# Patient Record
Sex: MALE | Race: WHITE
[De-identification: names, ages, dates, MRNs, and addresses within clinical notes are randomized per-mention and may not be internally consistent; named-entity substitution may affect disease eponyms.]

---

## 2021-03-29 ENCOUNTER — HOSPITAL ENCOUNTER (INPATIENT)
Dept: HOSPITAL 26 - MED | Age: 56
LOS: 3 days | Discharge: HOME | DRG: 720 | End: 2021-04-01
Attending: INTERNAL MEDICINE | Admitting: INTERNAL MEDICINE
Payer: COMMERCIAL

## 2021-03-29 VITALS — SYSTOLIC BLOOD PRESSURE: 106 MMHG | DIASTOLIC BLOOD PRESSURE: 68 MMHG

## 2021-03-29 VITALS — SYSTOLIC BLOOD PRESSURE: 118 MMHG | DIASTOLIC BLOOD PRESSURE: 73 MMHG

## 2021-03-29 VITALS — WEIGHT: 230 LBS | HEIGHT: 72 IN | BODY MASS INDEX: 31.15 KG/M2

## 2021-03-29 DIAGNOSIS — L03.116: ICD-10-CM

## 2021-03-29 DIAGNOSIS — E87.6: ICD-10-CM

## 2021-03-29 DIAGNOSIS — E66.9: ICD-10-CM

## 2021-03-29 DIAGNOSIS — E87.5: ICD-10-CM

## 2021-03-29 DIAGNOSIS — Z20.822: ICD-10-CM

## 2021-03-29 DIAGNOSIS — E87.1: ICD-10-CM

## 2021-03-29 DIAGNOSIS — A41.9: Primary | ICD-10-CM

## 2021-03-29 DIAGNOSIS — F15.10: ICD-10-CM

## 2021-03-29 DIAGNOSIS — I10: ICD-10-CM

## 2021-03-29 LAB
ALBUMIN FLD-MCNC: 2.4 G/DL (ref 3.4–5)
ANION GAP SERPL CALCULATED.3IONS-SCNC: 9.9 MMOL/L (ref 8–16)
APPEARANCE UR: CLEAR
AST SERPL-CCNC: 31 U/L (ref 15–37)
BARBITURATES UR QL SCN: NEGATIVE NG/ML
BASOPHILS # BLD AUTO: 0.1 K/UL (ref 0–0.22)
BASOPHILS NFR BLD AUTO: 0.4 % (ref 0–2)
BENZODIAZ UR QL SCN: NEGATIVE NG/ML
BILIRUB SERPL-MCNC: 0.5 MG/DL (ref 0–1)
BILIRUB UR QL STRIP: NEGATIVE
BUN SERPL-MCNC: 6 MG/DL (ref 7–18)
BZE UR QL SCN: NEGATIVE NG/ML
CANNABINOIDS UR QL SCN: NEGATIVE NG/ML
CHLORIDE SERPL-SCNC: 95 MMOL/L (ref 98–107)
CO2 SERPL-SCNC: 27.3 MMOL/L (ref 21–32)
COLOR UR: YELLOW
CREAT SERPL-MCNC: 1.1 MG/DL (ref 0.6–1.3)
EOSINOPHIL # BLD AUTO: 0.2 K/UL (ref 0–0.4)
EOSINOPHIL NFR BLD AUTO: 1.2 % (ref 0–4)
ERYTHROCYTE [DISTWIDTH] IN BLOOD BY AUTOMATED COUNT: 14 % (ref 11.6–13.7)
GFR SERPL CREATININE-BSD FRML MDRD: 90 ML/MIN (ref 90–?)
GLUCOSE SERPL-MCNC: 154 MG/DL (ref 74–106)
GLUCOSE UR STRIP-MCNC: NEGATIVE MG/DL
HCT VFR BLD AUTO: 38.3 % (ref 36–52)
HGB BLD-MCNC: 12.6 G/DL (ref 12–18)
HGB UR QL STRIP: (no result)
LEUKOCYTE ESTERASE UR QL STRIP: NEGATIVE
LYMPHOCYTES # BLD AUTO: 1.5 K/UL (ref 2–11.5)
LYMPHOCYTES NFR BLD AUTO: 9.3 % (ref 20.5–51.1)
MCH RBC QN AUTO: 28 PG (ref 27–31)
MCHC RBC AUTO-ENTMCNC: 33 G/DL (ref 33–37)
MCV RBC AUTO: 85.8 FL (ref 80–94)
MONOCYTES # BLD AUTO: 1 K/UL (ref 0.8–1)
MONOCYTES NFR BLD AUTO: 6 % (ref 1.7–9.3)
NEUTROPHILS # BLD AUTO: 13.1 K/UL (ref 1.8–7.7)
NEUTROPHILS NFR BLD AUTO: 83.1 % (ref 42.2–75.2)
NITRITE UR QL STRIP: NEGATIVE
OPIATES UR QL SCN: NEGATIVE NG/ML
PCP UR QL SCN: NEGATIVE NG/ML
PH UR STRIP: 6 [PH] (ref 5–9)
PLATELET # BLD AUTO: 328 K/UL (ref 140–450)
POTASSIUM SERPL-SCNC: 3.2 MMOL/L (ref 3.5–5.1)
RBC # BLD AUTO: 4.47 MIL/UL (ref 4.2–6.1)
RBC #/AREA URNS HPF: (no result) /HPF (ref 0–5)
SODIUM SERPL-SCNC: 129 MMOL/L (ref 136–145)
WBC # BLD AUTO: 15.7 K/UL (ref 4.8–10.8)
WBC,URINE: (no result) /HPF (ref 0–5)

## 2021-03-29 NOTE — NUR
PT ASLEEP. VISIBLE CHEST RISE AND FALL NOTED. NO S/SX OF PAIN OR DISCOMFORT NOTED. SAFETY 
MEASURES IN PLACE. CALL LIGHT WITHIN REACH. WILL CONTINUE TO MONITOR.

## 2021-03-29 NOTE — NUR
Patient will be admitted to care of . Admited to [facility].  Will go to 
room 105A. Belongings list completed.  Report to MAR VAN.

## 2021-03-29 NOTE — NUR
56 Y/O MALE C/O LEFT LEG SWELLING WITH PUS FILLED SORE TO LOWER EXTREMITY. PT 
STATES HE WAS DISCHARGED FROM [facility] "A FEW WEEKS AGO" FOR INFECTION TO LOWER 
EXTREMITY. PT STATES HE IS UNABLE TO AMBULATE DUE TO PAIN. FAINT DISTAL PULSE 
ON LEFT EXTREMITY, COOL TO THE TOUCH. STATES 10/10 CONSTANT PAIN. AWAKE AND 
ALERT.

## 2021-03-29 NOTE — NUR
PT ARRIVED FROM ER TO UNIT VIA GURNEY. PT AAOX4 AND ABLE TO MAKE NEEDS KNOWN. PT ABLE TO 
AMBULATE WITH ASSISTANCE. RESPIRATIONS ARE EVEN AND UNLABORED TO ROOM AIR. ABDOMEN IS SOFT 
AND NON-TENDER, ACTIVE BOWEL SOUNDS NOTED. SKIN IS WARM AND DRY. PT WITH MULTIPLE CELLULITIS 
PRESENT ON LEFT LEG AND RIGHT LEG. PT WITH IV ACCESS ON LEFT AC G20 PATENT AND INTACT, 
SALINE LOCKED. PT WITH TOLERABLE LEG PAIN AS OF THIS MOMENT. PT WELCOMED AND ORIENTED TO 
ROOM. VS STABLE, MRSA SWAB COLLECTED. PT KEPT COMFORTABLE. WATER AND SNACK PROVIDED AS 
REQUESTED. SAFETY MEASURES IN PLACE, CALL LIGHT WITHIN REACH. WILL CONTINUE TO MONITOR.

## 2021-03-30 VITALS — SYSTOLIC BLOOD PRESSURE: 115 MMHG | DIASTOLIC BLOOD PRESSURE: 77 MMHG

## 2021-03-30 VITALS — SYSTOLIC BLOOD PRESSURE: 114 MMHG | DIASTOLIC BLOOD PRESSURE: 66 MMHG

## 2021-03-30 VITALS — SYSTOLIC BLOOD PRESSURE: 138 MMHG | DIASTOLIC BLOOD PRESSURE: 60 MMHG

## 2021-03-30 LAB
ALBUMIN FLD-MCNC: 2.1 G/DL (ref 3.4–5)
ANION GAP SERPL CALCULATED.3IONS-SCNC: 14.1 MMOL/L (ref 8–16)
AST SERPL-CCNC: 51 U/L (ref 15–37)
BASOPHILS # BLD AUTO: 0.1 K/UL (ref 0–0.22)
BASOPHILS NFR BLD AUTO: 0.5 % (ref 0–2)
BILIRUB SERPL-MCNC: 0.2 MG/DL (ref 0–1)
BUN SERPL-MCNC: 9 MG/DL (ref 7–18)
CHLORIDE SERPL-SCNC: 102 MMOL/L (ref 98–107)
CO2 SERPL-SCNC: 25.1 MMOL/L (ref 21–32)
CREAT SERPL-MCNC: 1 MG/DL (ref 0.6–1.3)
EOSINOPHIL # BLD AUTO: 0.2 K/UL (ref 0–0.4)
EOSINOPHIL NFR BLD AUTO: 1.8 % (ref 0–4)
ERYTHROCYTE [DISTWIDTH] IN BLOOD BY AUTOMATED COUNT: 14.3 % (ref 11.6–13.7)
GFR SERPL CREATININE-BSD FRML MDRD: 100 ML/MIN (ref 90–?)
GLUCOSE SERPL-MCNC: 98 MG/DL (ref 74–106)
HCT VFR BLD AUTO: 37.3 % (ref 36–52)
HGB BLD-MCNC: 12.2 G/DL (ref 12–18)
LYMPHOCYTES # BLD AUTO: 1.1 K/UL (ref 2–11.5)
LYMPHOCYTES NFR BLD AUTO: 8.3 % (ref 20.5–51.1)
MAGNESIUM SERPL-MCNC: 1.9 MG/DL (ref 1.8–2.4)
MCH RBC QN AUTO: 28 PG (ref 27–31)
MCHC RBC AUTO-ENTMCNC: 33 G/DL (ref 33–37)
MCV RBC AUTO: 85.3 FL (ref 80–94)
MONOCYTES # BLD AUTO: 1.4 K/UL (ref 0.8–1)
MONOCYTES NFR BLD AUTO: 10.1 % (ref 1.7–9.3)
NEUTROPHILS # BLD AUTO: 10.9 K/UL (ref 1.8–7.7)
NEUTROPHILS NFR BLD AUTO: 79.3 % (ref 42.2–75.2)
PHOSPHATE SERPL-MCNC: 3.9 MG/DL (ref 2.5–4.9)
PLATELET # BLD AUTO: 300 K/UL (ref 140–450)
POTASSIUM SERPL-SCNC: 4.2 MMOL/L (ref 3.5–5.1)
RBC # BLD AUTO: 4.37 MIL/UL (ref 4.2–6.1)
SODIUM SERPL-SCNC: 137 MMOL/L (ref 136–145)
WBC # BLD AUTO: 13.8 K/UL (ref 4.8–10.8)

## 2021-03-30 RX ADMIN — ENOXAPARIN SODIUM SCH MG: 40 INJECTION SUBCUTANEOUS at 08:48

## 2021-03-30 RX ADMIN — HYDROCODONE BITARTRATE AND ACETAMINOPHEN PRN TAB: 5; 325 TABLET ORAL at 10:06

## 2021-03-30 RX ADMIN — DOCUSATE SODIUM SCH MG: 100 CAPSULE, LIQUID FILLED ORAL at 08:48

## 2021-03-30 NOTE — NUR
RECEIVED PATIENT FROM NIGHT NURSE. PATIENT IN BED SLEEPING, CHEST NOTED RISING. NO ACUTE S/S 
DISTRESS. LAC 20G SL. DX: CELLULITIS TO BOTH LEGS. HOB ELEVATED. SAFETY MEASURES IN PLACE. 
CALL LIGHT WITHIN REACH. WILL CONTINUE TO MONITOR.

## 2021-03-30 NOTE — NUR
ROUNDS MADE. PT IN BED WATCHING TV. PT GIVEN SNACKS AS REQUESTED. PT DENIES ANY PAIN OR 
DISCOMFORT AT THIS TIME. CALL LIGHT WITHIN REACH. WILL CONTINUE TO MONITOR.

## 2021-03-30 NOTE — NUR
DC PLANNIN YRS OLD MALE PATIENT WAS ADMITTED FROM HOME WITH A DX OF CELLULITIS FAILED TO OUTPATIENT. 
PT HAS A HX OF HTN AND CHRONIC LOWER EXTREMITY EDEMA. CXR SHOWED MILD BIBASILAR ATELECTASIS. 
RAPID COVID TEST NEGATIVE.  US VENOUS DOPPLER SHOWED PERSISTENT LEFT INGUINAL 
LYMPHADENOPATHY. STARTED ON IVF, IV ABX ROCEPHIN AND PAIN MEDS. CONSULTED WITH ID AND WOUND 
CARE.   LIZBETH FOR METH ABUSE. DC PLAN TO GO HOME WHEN STABLE. CM TO FOLLOW.

-------------------------------------------------------------------------------

Addendum: 21 at 1229 by Radha Perdue 

-------------------------------------------------------------------------------

DC PLANNER:

PATIENTS PRESCRIPTION HAS BEEN SENT OVER TO [employer]. THEY WILL CALL WHEN 
PRESCRIPTION IS READY.

-------------------------------------------------------------------------------

Addendum: 21 at 1234 by Radha Perdue 

-------------------------------------------------------------------------------

DC PLANNER:

CONTACTED [facility] PATIENTS PRESCRIPTION IS READY FOR . IT WILL COST 
PATIENT $36.00. SPOKE TO PATIENT HE IS WILLING TO PAY. HE STATED THAT HE DOES NOT HAVE THE 
MONEY ON HIM RIGHT NOW BUT WILL HAVE IT ONCE HE IS PICKED UP.

## 2021-03-30 NOTE — NUR
[facility] CONSULT RECEIVED FOR WOUNDS/PRESSURE INJURIES NOT APPROPRIATE FOR CLOSED 
WOUNDS/CELLULITIS. FNS REFERRAL NOT APPROPRIATE FOR "NOT APPLICABLE" SELECTED WITHOUT ANY 
OTHER HIGH NUTRITION RISKS INDICATORS. 



PATIENT HAS BEEN SCREENED AND CATEGORIZED AS LOW NUTRITION RISK. PATIENT WILL BE SEEN WITHIN 
7 DAYS OF ADMISSION.



04/05/21



NELLY PATEL RD

## 2021-03-30 NOTE — NUR
NORCO GIVEN WITH EFFECTIVE RESULT. PATIENT IN BED RESTING. NO NOTED DISTRESS AT THIS TIME. 
DR LOYD AT BEDSIDE DISCUSSING PLAN OF CARE. WILL CONTINUE TO MONITOR.

## 2021-03-30 NOTE — NUR
PATIENT IN BED SLEEPING, CHEST NOTED RISING. NO NOTED ACUTE S/S DISTRESS. CALL LIGHT WITHIN 
REACH. WILL CONTINUE TO MONITOR

## 2021-03-30 NOTE — NUR
PATIENT IN BED AWAKE AND ALERT. MORNING ROUTINE MEDICATIONS GIVEN. PATIENT TOLERATED WELL. 
LAC 20G INTACT AND PATENT, SL. SKIN WARM TO TOUCH AND INTACT. BILATERAL LOWER LEGS NOTED 
WITH EDEMA. MORE REDNESS NOTED TO LEFT LOWER LEG, WITH  MULTIPLE SCABS NOTED, WITH SOME 
OZZING DRAINAGE. PATIENT ABLE TO AMBULATE TO THE BATHROOM WITHOUT ANY ASSIST. PLAN OF CARE 
DISCUSSED WITH PATIENT. PATIENT VERBALIZED UNDERSTANDING. CALL LIGHT WITHIN REACH. WILL 
CONTINUE TO MONITOR.

## 2021-03-30 NOTE — NUR
PT IN BED RESTING. VANCOMYCIN GIVEN AS PER ORDER. NO REQUESTS MADE AT THIS TIME. WILL 
CONTINUE TO MONITOR.

## 2021-03-30 NOTE — NUR
WOUND CULTURE OBTAINED AND SENT TO LAB. PATIENT IN BED AWAKE AND ALERT. NO NOTED DISTRESS. 
WILL CONTINUE TO MONITOR.

## 2021-03-30 NOTE — NUR
SOCIAL WORK NOTE:





Patient's Orientation 

Person

Situation

Place

Time

Information Provided By 

PATIENT

Comments 

SW MET WITH PATIENT AT BEDSIDE TO COMPLETE ASSESSMENT. 

, Realtionship and Phone Number 

KARINA GARBER

FRIEND

[phone]

Healthcare Power of  

No

Does Patient Have a POLST 

No

Identifying Problems 

No Social Work Triggers

Is A Social Work Consult Needed 

No

Mandate Report Filed 

No

Explanation Of Identifying Problems 

PATIENT IS A 55-YEAR-OLD JYOTI DMITTED FOR CELLULITIS. PATIENT HAS NO 

REPORTED PMHX. PATIENT STATED THAT HE IS NO LONGER HOMELESS AND LIVES WITH 

FRIEND, PROSPER STEPHENS ON [address] [place] [facility]. PATIENT DID 

NOT HAVE ADDRESS AT HAND. SW RECEIVED PHYSICIAN'S ORDER FOR SUBSTANCE 

ABUSE. DURING ASSESSMENT, PATIENT DENIED SUBSTANCE ABUSE AND MENTAL HEALTH 

HX.

Admitted From 

Home

Pre-Admission Level Of Functioning Status 

Independent/Ambulatory

Prior Resources/Services Used In Last 12 Months 

No Prior Resources Used

Prior DME 

No Prior DME Used

Dialysis Comments 

N/A

Living Situation 

Lives With Friend/Other

House

Patient Had Caregiver 

No

Home Support 

No Caregiver Issues

Financial Issues 

No Known Financial Issue

Referral To The Financial Counselor Needed 

No

Factors/Needs 

No D/C Needs Identified

Pt/Rep Participated In Discharge Plan 

Yes

Patient/Family Agress With Discharge Plan 

Yes

Discharge Plan Comments 

TENTATIVE DISCHARGE PLAN IS FOR PATIENT TO RETURN HOME.

DC Plan Status 

Initiated

## 2021-03-30 NOTE — NUR
PATIENT IN BED SLEEPING, CHEST NOTED RISING. NO NOTED ACUTE S/S DISTRESS AT THIS TIME. LOWER 
LEGS NOTED WITH SCABS WITH DRIED DRAINAGE. NO ACTIVE BLEEDING OR DRAINING AT THIS TIME. 
AFFECTED AREAS LEAVE GALILEO. LEGS CONTINUE WITH SWELLING AND REDNESS. PATIENT ENCOURAGED TO 
SEEK STAFF FOR ASSIST AS NEEDED. PATIENT VERBALIZED UNDERSTANDING. WILL CONTINUE TO MONITOR.

## 2021-03-30 NOTE — NUR
VS STABLE. PT IN BED RESTING. NO REQUESTS MADE AT THIS TIME. PT KEPT COMFORTABLE. CALL LIGHT 
WITHIN REACH, WILL CONTINUE TO MONITOR.

## 2021-03-30 NOTE — NUR
RECEIVED REPORT FROM DAY SHIFT NURSE. PT AAOX4 AND ABLE TO MAKE NEEDS KNOWN. PT ABLE TO 
AMBULATE WITH ASSISTANCE. RESPIRATIONS ARE EVEN AND UNLABORED TO ROOM AIR. ABDOMEN IS SOFT 
AND NON-TENDER, ACTIVE BOWEL SOUNDS NOTED. SKIN IS WARM AND DRY. PT WITH MULTIPLE CELLULITIS 
SCABS WITH MINIMAL DRAININAGE PRESENT ON LEFT LEG AND RIGHT LEG. PT WITH IV ACCESS ON LEFT 
AC G20 PATENT AND INTACT, SALINE LOCKED. PT WITH TOLERABLE LEG PAIN AS OF THIS MOMENT. NO 
REQUESTS MADE. SAFETY MEASURES IN PLACE, CALL LIGHT WITHIN REACH. WILL CONTINUE TO MONITOR.

## 2021-03-31 VITALS — SYSTOLIC BLOOD PRESSURE: 135 MMHG | DIASTOLIC BLOOD PRESSURE: 73 MMHG

## 2021-03-31 VITALS — DIASTOLIC BLOOD PRESSURE: 63 MMHG | SYSTOLIC BLOOD PRESSURE: 115 MMHG

## 2021-03-31 VITALS — DIASTOLIC BLOOD PRESSURE: 64 MMHG | SYSTOLIC BLOOD PRESSURE: 119 MMHG

## 2021-03-31 LAB
ALBUMIN FLD-MCNC: 2 G/DL (ref 3.4–5)
ANION GAP SERPL CALCULATED.3IONS-SCNC: 14 MMOL/L (ref 8–16)
AST SERPL-CCNC: 114 U/L (ref 15–37)
BASOPHILS # BLD AUTO: 0.1 K/UL (ref 0–0.22)
BASOPHILS NFR BLD AUTO: 0.5 % (ref 0–2)
BILIRUB SERPL-MCNC: 0.3 MG/DL (ref 0–1)
BUN SERPL-MCNC: 10 MG/DL (ref 7–18)
CHLORIDE SERPL-SCNC: 101 MMOL/L (ref 98–107)
CO2 SERPL-SCNC: 26.5 MMOL/L (ref 21–32)
CREAT SERPL-MCNC: 0.9 MG/DL (ref 0.6–1.3)
EOSINOPHIL # BLD AUTO: 0.4 K/UL (ref 0–0.4)
EOSINOPHIL NFR BLD AUTO: 3.1 % (ref 0–4)
ERYTHROCYTE [DISTWIDTH] IN BLOOD BY AUTOMATED COUNT: 13.7 % (ref 11.6–13.7)
GFR SERPL CREATININE-BSD FRML MDRD: 113 ML/MIN (ref 90–?)
GLUCOSE SERPL-MCNC: 100 MG/DL (ref 74–106)
HCT VFR BLD AUTO: 37.4 % (ref 36–52)
HGB BLD-MCNC: 12.3 G/DL (ref 12–18)
LYMPHOCYTES # BLD AUTO: 1.2 K/UL (ref 2–11.5)
LYMPHOCYTES NFR BLD AUTO: 9.1 % (ref 20.5–51.1)
MAGNESIUM SERPL-MCNC: 1.8 MG/DL (ref 1.8–2.4)
MCH RBC QN AUTO: 28 PG (ref 27–31)
MCHC RBC AUTO-ENTMCNC: 33 G/DL (ref 33–37)
MCV RBC AUTO: 85.4 FL (ref 80–94)
MONOCYTES # BLD AUTO: 1.1 K/UL (ref 0.8–1)
MONOCYTES NFR BLD AUTO: 8.3 % (ref 1.7–9.3)
NEUTROPHILS # BLD AUTO: 10.4 K/UL (ref 1.8–7.7)
NEUTROPHILS NFR BLD AUTO: 79 % (ref 42.2–75.2)
PHOSPHATE SERPL-MCNC: 4.1 MG/DL (ref 2.5–4.9)
PLATELET # BLD AUTO: 341 K/UL (ref 140–450)
POTASSIUM SERPL-SCNC: 4.5 MMOL/L (ref 3.5–5.1)
RBC # BLD AUTO: 4.38 MIL/UL (ref 4.2–6.1)
SODIUM SERPL-SCNC: 137 MMOL/L (ref 136–145)
WBC # BLD AUTO: 13.2 K/UL (ref 4.8–10.8)

## 2021-03-31 RX ADMIN — DEXTROSE SCH MLS/HR: 5 SOLUTION INTRAVENOUS at 18:42

## 2021-03-31 RX ADMIN — DOCUSATE SODIUM SCH MG: 100 CAPSULE, LIQUID FILLED ORAL at 08:38

## 2021-03-31 RX ADMIN — HYDROCODONE BITARTRATE AND ACETAMINOPHEN PRN TAB: 5; 325 TABLET ORAL at 12:52

## 2021-03-31 RX ADMIN — DEXTROSE SCH MLS/HR: 5 SOLUTION INTRAVENOUS at 12:53

## 2021-03-31 RX ADMIN — ENOXAPARIN SODIUM SCH MG: 40 INJECTION SUBCUTANEOUS at 08:38

## 2021-03-31 NOTE — NUR
PT ASLEEP. VISIBLE CHEST RISE AND FALL NOTED. PT NOT IN DISTRESS. NO S/S OF PAIN OR 
DISCOMFORT NOTED. PT KEPT COMFORTABLE. WILL CONTINUE TO MONITOR.

## 2021-03-31 NOTE — NUR
PATIENT IS SLEEPING. CHEST RISE AND FALL NOTED. NO SIGN OF DISTRESS. CALL LIGHT WITHIN 
REACH. WILL CONTINUE TO MONITOR.

## 2021-03-31 NOTE — NUR
WOUND CARE EVALUATION NOTE:

WOUND ASSESSMENT DONE TO THIS 56 Y/O PT. WHO IS AAX4. PT. ADMITTED WITH BLE CELLULITIS. RLE 
MULTIPLE DRY SCABS, ANTERIOR LOWER LEG 0.5X0.5CM MOIST SCAB, KENNETH WOUND SKIN WEEPING SKIN 
WITH SEROUS FLUIDS AND LLE MULTIPLE MOIST SOFT SCAB, SKIN WARM, ERYTHEMA, WEEPING SKIN WITH 
SEROUS FLUIDS. LARGEST SOFT MOIST SCAB TO LEFT MEDIAL LOWER LEG WOUND WITH PARTIAL THICKNESS 
SKIN LOSS 2X1X0.1CM WOUND % GRANULATING TISSUE, MOIST, NO ODOR, KENNETH WOUND SKIN 
ERYTHEMA, PAIN 2/10. POC DISCUSSED WITH PT AND PRIMARY RN. PT. VERBALIZES UNDERSTANDING.



RECOMMENDATIONS:

-CLEANSE LEFT AND RIGHT LOWER LEG SOFT SCABS WOUND WITH NS, PAT DRY, APPLY SILVASORB GEL, 
COVER WITH OIL IMMERSION DRESSING WRAP WITH KERLIX ROLL QD AND PRN IF SOILING.

-PAINT BLE DRY SCABS WITH BETADINE SOLUTION BID AND GALILEO

-ELEVATED BLE, OFFLOADING HEELS

## 2021-03-31 NOTE — NUR
PATIENT ASLEEP ON SIDE, LOOKS COMFORTABLE. VISIBLE CHEST RISING SYMMETRICALLY. PATIENT NOT 
RUNNING ANY FLUIDS A9T THIS TIME. PATEINT STATES NO PAIN PAIN OR DISCOMFORT WHEN ASKED. WILL 
CONTINUE MONITORING.

## 2021-03-31 NOTE — NUR
NO SIGNIFICANT CHANGES HAPPENED DURING SHIFT. PATIENT REMAINS STABLE. VS STABLE. PATIENT 
A0X4. PATIENT HAD C/O PAIN AT 1257 AND NORCO WAS GIVEN FOR PAIN 8/10. NO MORE C/O PAIN AFTER 
THAT. WOUND CARE CAME TO CHANGE WOUND. GAVE REPORT TO INCOMING NURSE.

## 2021-03-31 NOTE — NUR
RECEIVING PATIENT FROM AM NURSE FOR CONTINUITY OF CARE. PATIENT IS A/A/O X4. RESPIRATORY 
EVEN AND UNLABORED, ON ROOM AIR. SKIN WARM, DRY, NON-DIAPHORETIC, MULTIPLE CELLULITIS SCABS 
WITH MINIMAL DRAINAGE NOTED ON LEFT LEG. IV ON LEFT AC 20G WITH SALINE LOCK. DENIES ANY PAIN 
OR DISCOMFORT. ABLE TO MAKE NEEDS KNOWN. PLAN OF CARE DISCUSSED, PATIENT VERBALIZED 
UNDERSTANDING. PRECAUTION IN PLACE. CALL LIGHT WITHIN REACH. WILL CONTINUE TO MONITOR.

## 2021-03-31 NOTE — NUR
ROUNDS MADE. PT IN BED RESTING. PT NOT IN PAIN. NO REQUESTS MADE AT THIS TIME. PT KEPT 
COMFORTABLE. WILL CONTINUE TO MONITOR.

## 2021-03-31 NOTE — NUR
PATIENT REQUESTS SOME SNACK AND JUICE. PATIENT SATISFIED WITH APPLE JUICE AND JELLO. CALL 
LIGHT WITHIN REACH. WILL CONTINUE TO MONITOR.

## 2021-03-31 NOTE — NUR
VS STABLE. PT NOT IN DISTRESS. DENIES ANY PAIN OR DISCOMFORT. NO REQUESTS MADE AT THIS TIME. 
PT KEPT COMFORTABLE. WILL CONTINUE TO MONITOR.

## 2021-04-01 VITALS — SYSTOLIC BLOOD PRESSURE: 127 MMHG | DIASTOLIC BLOOD PRESSURE: 80 MMHG

## 2021-04-01 LAB
ALBUMIN FLD-MCNC: 2 G/DL (ref 3.4–5)
ANION GAP SERPL CALCULATED.3IONS-SCNC: 11.7 MMOL/L (ref 8–16)
AST SERPL-CCNC: 58 U/L (ref 15–37)
BASOPHILS # BLD AUTO: 0 K/UL (ref 0–0.22)
BASOPHILS NFR BLD AUTO: 0.4 % (ref 0–2)
BILIRUB SERPL-MCNC: 0.3 MG/DL (ref 0–1)
BUN SERPL-MCNC: 10 MG/DL (ref 7–18)
CHLORIDE SERPL-SCNC: 101 MMOL/L (ref 98–107)
CO2 SERPL-SCNC: 27.5 MMOL/L (ref 21–32)
CREAT SERPL-MCNC: 0.9 MG/DL (ref 0.6–1.3)
EOSINOPHIL # BLD AUTO: 0.6 K/UL (ref 0–0.4)
EOSINOPHIL NFR BLD AUTO: 4.8 % (ref 0–4)
ERYTHROCYTE [DISTWIDTH] IN BLOOD BY AUTOMATED COUNT: 13.8 % (ref 11.6–13.7)
GFR SERPL CREATININE-BSD FRML MDRD: 113 ML/MIN (ref 90–?)
GLUCOSE SERPL-MCNC: 103 MG/DL (ref 74–106)
HCT VFR BLD AUTO: 36.7 % (ref 36–52)
HGB BLD-MCNC: 12.2 G/DL (ref 12–18)
LYMPHOCYTES # BLD AUTO: 1.4 K/UL (ref 2–11.5)
LYMPHOCYTES NFR BLD AUTO: 11.7 % (ref 20.5–51.1)
MAGNESIUM SERPL-MCNC: 1.9 MG/DL (ref 1.8–2.4)
MCH RBC QN AUTO: 28 PG (ref 27–31)
MCHC RBC AUTO-ENTMCNC: 33 G/DL (ref 33–37)
MCV RBC AUTO: 84.9 FL (ref 80–94)
MONOCYTES # BLD AUTO: 0.9 K/UL (ref 0.8–1)
MONOCYTES NFR BLD AUTO: 7.1 % (ref 1.7–9.3)
NEUTROPHILS # BLD AUTO: 9.3 K/UL (ref 1.8–7.7)
NEUTROPHILS NFR BLD AUTO: 76 % (ref 42.2–75.2)
PHOSPHATE SERPL-MCNC: 4.2 MG/DL (ref 2.5–4.9)
PLATELET # BLD AUTO: 377 K/UL (ref 140–450)
POTASSIUM SERPL-SCNC: 4.2 MMOL/L (ref 3.5–5.1)
RBC # BLD AUTO: 4.31 MIL/UL (ref 4.2–6.1)
SODIUM SERPL-SCNC: 136 MMOL/L (ref 136–145)
WBC # BLD AUTO: 12.3 K/UL (ref 4.8–10.8)

## 2021-04-01 RX ADMIN — ENOXAPARIN SODIUM SCH MG: 40 INJECTION SUBCUTANEOUS at 08:43

## 2021-04-01 RX ADMIN — DEXTROSE SCH MLS/HR: 5 SOLUTION INTRAVENOUS at 10:50

## 2021-04-01 RX ADMIN — DOCUSATE SODIUM SCH MG: 100 CAPSULE, LIQUID FILLED ORAL at 08:41

## 2021-04-01 RX ADMIN — DEXTROSE SCH MLS/HR: 5 SOLUTION INTRAVENOUS at 02:51

## 2021-04-01 NOTE — NUR
PATIENT SEEN AMBULATE TO BATHROOM INDEPENDENTLY WITH STEADY GAIT. NO SIGN OF DISTRESS NOTED. 
WILL CONTINUE TO MONITOR.

## 2021-04-01 NOTE — NUR
RECEIVED REPORT FROM NIGHT NURSE PT IS AAOX4 ON ROOM AIR, AMBULATORY WITH ASSIST, SKIN NON 
INTACT ON BILATERAL LOWER LEG CELLULITIS AND MULTIPLE SCABS ON LEFT LEG, SEEN BY WOUND CARE 
NURSE, IV INTACT ON LEFT AC. SAFETY MEASURES IN PLACE AND CALL LIGHT WITHIN REACH. WILL 
CONTINUE TO MONITOR.

## 2021-04-01 NOTE — NUR
ROUND CHECK. PATIENT IS SLEEPING, EASY AROUSABLE WITH VOICE. NO SIGN OF DISTRESS NOTED. CALL 
LIGHT WITHIN REACH. WILL CONTINUE TO MONITOR.

## 2021-04-01 NOTE — NUR
CHANGE AND CLEANED PATIENT WOUND WITH NS PAT DRY APPLIED SILVASORB GEL AND WITH OIL EMULSION 
DRESSING THEN WRAP WITH KERLIX. PT TOLERATED WELL.

## 2021-04-01 NOTE — NUR
PATIENT IS RESTING IN BED. NO SIGN OF RESPIRATORY DISTRESS NOTED. PRECAUTION IN PLACE. CALL 
LIGHT WITHIN REACH. WILL CONTINUE TO MONITOR.

## 2021-04-01 NOTE — NUR
ROUND CHECK. PATIENT IS SLEEPING. CHEST RISE AND FALL NOTED. CALL LIGHT WITHIN REACH. WILL 
CONTINUE TO MONITOR.